# Patient Record
(demographics unavailable — no encounter records)

---

## 2025-07-30 NOTE — HISTORY OF PRESENT ILLNESS
[de-identified] : 65-year-old male with a chief complaint of neck pain.  He has had issues now for couple of years progressively getting worse.  He was seeing a chiropractor in the past.  He describes the pain as a tightness primarily in the left side of his neck into the shoulder.  He also has pain that radiates down the shoulder blade bilaterally more on the left side.  No numbness or tingling.  No pins-and-needles.  No weakness that he is noted.  No recent changes in his bowel or bladder habits.  No recent trauma.  He has not had any treatments up until date.

## 2025-07-30 NOTE — PHYSICAL EXAM
[de-identified] : Healthy male in good shape no acute distress.  Cervical range of motion within normal limits.  Spurling's maneuver on the left aggravates his pain.  It is negative on the right.  Strength is 5 out of 5.  Reflexes are within normal limits and symmetrical.  Kei sign is negative.  Light touch sensations intact. [de-identified] : I independently reviewed x-rays of the cervical spine.  It demonstrates disc degeneration with disc narrowing and osteophyte formation at C4-5.  The oblique images show bilateral foraminal stenosis at that level.  There is mild spondylosis changes elsewhere.  No obvious osseous lesions.  I also reviewed x-rays of the lumbar spine.  These also demonstrate spondylosis changes primarily at L5-S1.  No scoliosis noted.  No other osseous lesions.

## 2025-07-30 NOTE — ASSESSMENT
[FreeTextEntry1] : Jaiden has cervical spondylosis with radiculopathy.  We do lengthy talk today in the office.  He really has not had any treatments to date and so I am going to recommend an anti-inflammatory for a few weeks.  Maybe that will be enough to loosen things up for him.  We talked about potentially physical therapy but he is already exercising on a regular basis at home and would like to avoid that.  We did talk about massage and acupuncture as adjunct of measures to help.  If the NSAIDs do not improve his situation then I would recommend an MRI scan with the intention of discussing additional treatment options including injection therapy etc.  He is comfortable with this plan.  All of his questions were addressed.  Follow-up with me by telephone in the next couple of weeks.